# Patient Record
Sex: FEMALE | Race: WHITE | NOT HISPANIC OR LATINO | URBAN - METROPOLITAN AREA
[De-identification: names, ages, dates, MRNs, and addresses within clinical notes are randomized per-mention and may not be internally consistent; named-entity substitution may affect disease eponyms.]

---

## 2017-04-03 PROBLEM — Z34.90 PREGNANCY: Status: ACTIVE | Noted: 2017-04-03

## 2017-04-06 PROBLEM — Z34.90 PREGNANCY: Status: RESOLVED | Noted: 2017-04-03 | Resolved: 2017-04-06

## 2017-05-22 PROBLEM — Z12.31 ENCOUNTER FOR SCREENING MAMMOGRAM FOR HIGH-RISK PATIENT: Status: ACTIVE | Noted: 2017-05-22

## 2018-09-26 PROBLEM — O30.039 MONOCHORIONIC DIAMNIOTIC TWIN PREGNANCY, ANTEPARTUM: Status: ACTIVE | Noted: 2018-09-26

## 2018-10-24 PROBLEM — O35.EXX0 RENAL AGENESIS OF FETUS AFFECTING ANTEPARTUM CARE OF MOTHER: Status: ACTIVE | Noted: 2018-10-24

## 2018-10-24 PROBLEM — O35.EXX0 RENAL AGENESIS OF FETUS AFFECTING ANTEPARTUM CARE OF MOTHER: Status: RESOLVED | Noted: 2018-10-24 | Resolved: 2018-10-24

## 2018-10-24 PROBLEM — O35.EXX0 ULTRASOUND RECHECK OF FETAL PYELECTASIS, ANTEPARTUM: Status: ACTIVE | Noted: 2018-10-24

## 2018-12-24 PROBLEM — O47.03 PRETERM UTERINE CONTRACTIONS IN THIRD TRIMESTER, ANTEPARTUM: Status: ACTIVE | Noted: 2018-12-24

## 2018-12-30 PROBLEM — Z34.90 TERM PREGNANCY: Status: ACTIVE | Noted: 2018-12-30

## 2018-12-31 PROBLEM — Z34.90 TERM PREGNANCY: Status: RESOLVED | Noted: 2018-12-30 | Resolved: 2018-12-31

## 2018-12-31 PROBLEM — O47.03 PRETERM UTERINE CONTRACTIONS IN THIRD TRIMESTER, ANTEPARTUM: Status: RESOLVED | Noted: 2018-12-24 | Resolved: 2018-12-31

## 2018-12-31 PROBLEM — O30.019 MONOCHORIONIC AND MONOAMNIOTIC TWIN GESTATION: Status: ACTIVE | Noted: 2018-12-31

## 2018-12-31 PROBLEM — O35.EXX0 ULTRASOUND RECHECK OF FETAL PYELECTASIS, ANTEPARTUM: Status: RESOLVED | Noted: 2018-10-24 | Resolved: 2018-12-31

## 2019-01-02 PROBLEM — Z12.31 ENCOUNTER FOR SCREENING MAMMOGRAM FOR HIGH-RISK PATIENT: Status: RESOLVED | Noted: 2017-05-22 | Resolved: 2019-01-02

## 2019-01-02 PROBLEM — O30.039 MONOCHORIONIC DIAMNIOTIC TWIN PREGNANCY, ANTEPARTUM: Status: RESOLVED | Noted: 2018-09-26 | Resolved: 2019-01-02

## 2024-08-19 ENCOUNTER — TRANSCRIBE ORDERS (OUTPATIENT)
Dept: ADMINISTRATIVE | Facility: HOSPITAL | Age: 34
End: 2024-08-19

## 2024-08-19 DIAGNOSIS — Z12.31 ENCOUNTER FOR SCREENING MAMMOGRAM FOR MALIGNANT NEOPLASM OF BREAST: Primary | ICD-10-CM

## 2024-08-24 ENCOUNTER — TRANSCRIBE ORDERS (OUTPATIENT)
Dept: ADMINISTRATIVE | Facility: HOSPITAL | Age: 34
End: 2024-08-24
Payer: COMMERCIAL

## 2024-08-24 DIAGNOSIS — Z12.31 SCREENING MAMMOGRAM FOR HIGH-RISK PATIENT: Primary | ICD-10-CM

## 2024-08-29 LAB — NCCN CRITERIA FLAG: ABNORMAL

## 2024-12-08 ENCOUNTER — HOSPITAL ENCOUNTER (EMERGENCY)
Facility: HOSPITAL | Age: 34
Discharge: HOME | End: 2024-12-08
Attending: EMERGENCY MEDICINE | Admitting: EMERGENCY MEDICINE
Payer: COMMERCIAL

## 2024-12-08 ENCOUNTER — APPOINTMENT (OUTPATIENT)
Facility: HOSPITAL | Age: 34
End: 2024-12-08
Payer: COMMERCIAL

## 2024-12-08 VITALS
WEIGHT: 170 LBS | OXYGEN SATURATION: 100 % | HEART RATE: 104 BPM | SYSTOLIC BLOOD PRESSURE: 93 MMHG | DIASTOLIC BLOOD PRESSURE: 60 MMHG | BODY MASS INDEX: 30.12 KG/M2 | TEMPERATURE: 99.2 F | HEIGHT: 63 IN | RESPIRATION RATE: 18 BRPM

## 2024-12-08 DIAGNOSIS — J18.9 PNEUMONIA OF LEFT LOWER LOBE DUE TO INFECTIOUS ORGANISM: Primary | ICD-10-CM

## 2024-12-08 LAB
FLUAV RNA RESP QL NAA+PROBE: NOT DETECTED
FLUBV RNA RESP QL NAA+PROBE: NOT DETECTED
RSV RNA RESP QL NAA+PROBE: NOT DETECTED
SARS-COV-2 RNA RESP QL NAA+PROBE: NOT DETECTED

## 2024-12-08 PROCEDURE — 87637 SARSCOV2&INF A&B&RSV AMP PRB: CPT | Performed by: EMERGENCY MEDICINE

## 2024-12-08 PROCEDURE — 71045 X-RAY EXAM CHEST 1 VIEW: CPT

## 2024-12-08 PROCEDURE — 99283 EMERGENCY DEPT VISIT LOW MDM: CPT

## 2024-12-08 PROCEDURE — 94640 AIRWAY INHALATION TREATMENT: CPT

## 2024-12-08 RX ORDER — ALBUTEROL SULFATE 0.83 MG/ML
2.5 SOLUTION RESPIRATORY (INHALATION) ONCE
Status: COMPLETED | OUTPATIENT
Start: 2024-12-08 | End: 2024-12-08

## 2024-12-08 RX ORDER — DOXYCYCLINE 100 MG/1
100 CAPSULE ORAL 2 TIMES DAILY
Qty: 19 CAPSULE | Refills: 0 | Status: SHIPPED | OUTPATIENT
Start: 2024-12-08 | End: 2024-12-18

## 2024-12-08 RX ORDER — DOXYCYCLINE 100 MG/1
100 CAPSULE ORAL ONCE
Status: COMPLETED | OUTPATIENT
Start: 2024-12-08 | End: 2024-12-08

## 2024-12-08 RX ORDER — IBUPROFEN 200 MG
600 TABLET ORAL ONCE
Status: COMPLETED | OUTPATIENT
Start: 2024-12-08 | End: 2024-12-08

## 2024-12-08 RX ORDER — DOXYCYCLINE 100 MG/1
100 CAPSULE ORAL 2 TIMES DAILY
Qty: 19 CAPSULE | Refills: 0 | Status: SHIPPED | OUTPATIENT
Start: 2024-12-08 | End: 2024-12-08

## 2024-12-08 RX ADMIN — IBUPROFEN 600 MG: 200 TABLET, FILM COATED ORAL at 12:48

## 2024-12-08 RX ADMIN — ALBUTEROL SULFATE 2.5 MG: 2.5 SOLUTION RESPIRATORY (INHALATION) at 13:27

## 2024-12-08 RX ADMIN — DOXYCYCLINE 100 MG: 100 CAPSULE ORAL at 13:41

## 2024-12-08 NOTE — ED NOTES
Call received pt stating the pharmacy has not received her script. Call placed to [facility] [address]. Confirmed they have received script.

## 2024-12-09 NOTE — FSED PROVIDER NOTE
Subjective  History of Present Illness:    Presents with shortness of breath cough is been ongoing last couple days just cannot catch her breath does not have any history of asthma no fevers chills nausea vomiting abdominal pain no sick contacts      Nurses Notes reviewed and agree, including vitals, allergies, social history and prior medical history.     REVIEW OF SYSTEMS: All systems reviewed and not pertinent unless noted.  Review of Systems    Past Medical History:   Diagnosis Date    Breast cancer 2016    w radiation    Ductal carcinoma     Hx of radiation therapy 2016    Postoperative pain     mild       Allergies:    Patient has no known allergies.      Past Surgical History:   Procedure Laterality Date    BREAST BIOPSY  2016    sentinel node    BREAST LUMPECTOMY  2016         Social History     Socioeconomic History    Marital status:    Tobacco Use    Smoking status: Former     Current packs/day: 0.00     Average packs/day: 0.5 packs/day for 10.0 years (5.0 ttl pk-yrs)     Types: Cigarettes     Start date: 3/30/2008     Quit date: 3/30/2018     Years since quittin.7    Smokeless tobacco: Never   Substance and Sexual Activity    Alcohol use: No     Alcohol/week: 2.0 standard drinks of alcohol     Types: 1 Glasses of wine, 1 Standard drinks or equivalent per week     Comment: 2-3 beverages a month, when not pregnant    Drug use: No    Sexual activity: Yes     Partners: Male     Birth control/protection: I.U.D.         Family History   Problem Relation Age of Onset    Breast cancer Other 80    Stomach cancer Other     Lung cancer Other     Heart disease Maternal Grandmother     Other Maternal Grandmother         vascular disease    Heart disease Maternal Grandfather     Other Maternal Grandfather         vascular disease    Cancer Mother          this year from ovarian cancer    Ovarian cancer Neg Hx        Objective  Physical Exam:  BP 93/60   Pulse 104   Temp 99.2 °F (37.3  "°C) (Oral)   Resp 18   Ht 160 cm (63\")   Wt 77.1 kg (170 lb)   LMP 12/02/2024 (Approximate)   SpO2 100%   BMI 30.11 kg/m²      Physical Exam  Vitals and nursing note reviewed.   Constitutional:       General: She is not in acute distress.     Appearance: Normal appearance. She is not ill-appearing, toxic-appearing or diaphoretic.   HENT:      Head: Normocephalic and atraumatic.      Nose: Nose normal.      Mouth/Throat:      Mouth: Mucous membranes are moist.   Eyes:      Conjunctiva/sclera: Conjunctivae normal.      Pupils: Pupils are equal, round, and reactive to light.   Cardiovascular:      Rate and Rhythm: Normal rate and regular rhythm.      Pulses: Normal pulses.      Heart sounds: Normal heart sounds.   Pulmonary:      Effort: Pulmonary effort is normal.      Breath sounds: Rhonchi present. No wheezing.   Abdominal:      General: Abdomen is flat.      Tenderness: There is no abdominal tenderness.   Musculoskeletal:         General: Normal range of motion.      Cervical back: Normal range of motion.   Skin:     General: Skin is warm and dry.      Capillary Refill: Capillary refill takes less than 2 seconds.   Neurological:      General: No focal deficit present.      Mental Status: She is alert and oriented to person, place, and time.   Psychiatric:         Mood and Affect: Mood normal.         Behavior: Behavior normal.         Procedures    ED Course:         Lab Results (last 24 hours)       Procedure Component Value Units Date/Time    COVID PRE-OP / PRE-PROCEDURE SCREENING ORDER (NO ISOLATION) - Swab, Nasopharynx [[id number]]  (Normal) Collected: 12/08/24 1246    Specimen: Swab from Nasopharynx Updated: 12/08/24 1327    Narrative:      The following orders were created for panel order COVID PRE-OP / PRE-PROCEDURE SCREENING ORDER (NO ISOLATION) - Swab, Nasopharynx.  Procedure                               Abnormality         Status                     ---------                               " -----------         ------                     COVID-19, FLU A/B, RSV P...[[id number]]  Normal              Final result                 Please view results for these tests on the individual orders.    COVID-19, FLU A/B, RSV PCR 1 HR TAT - Swab, Nasopharynx [[id number]]  (Normal) Collected: 12/08/24 1246    Specimen: Swab from Nasopharynx Updated: 12/08/24 1327     COVID19 Not Detected     Influenza A PCR Not Detected     Influenza B PCR Not Detected     RSV, PCR Not Detected    Narrative:      Fact sheet for providers: https://www.fda.gov/media/680822/download    Fact sheet for patients: https://www.fda.gov/media/074498/download    Test performed by PCR.             XR Chest 1 View    Result Date: 12/8/2024  XR CHEST 1 VW Date of Exam: 12/8/2024 12:55 PM EST Indication: Cough flu like symptoms Comparison: None available. Findings: Lungs are adequately expanded. There is suspected mild retrocardiac left basilar opacity. No large pleural effusion is seen. Heart size appears within normal limits.     Impression: Impression: Suspected mild retrocardiac left basilar opacity, which may be due to atelectasis and/or pneumonia. Electronically Signed: Elizabeth Calvillo  12/8/2024 1:20 PM EST  Workstation ID: [id number]        MDM      Summary patient presenting with cough congestion shortness of breath concern for pneumonia.  CBC CMP unremarkable patient chest x-ray does show a left lower lobe opacity patient does have rhonchi on exam.  Patient was given DuoNeb.  Patient feeling much better upon reassessment patient given initial dose of doxycycline prescription sent to the pharmacy for further management.  Given return precautions discharged well-appearing      Data interpreted: Nursing notes reviewed, vital signs reviewed.  Labs independently interpreted by me (CBC, CMP, lipase, UA, troponin, ABG, lactic acid, procalcitonin).  Imaging independently interpreted by me (x-ray, CT scan).  EKG independently interpreted by me.  O2  saturation:    Counseling: Discussed the results above with the patient regarding need for admission or discharge.  Patient understands and agrees plan of care.      -----  ED Disposition       ED Disposition   Discharge    Condition   Stable    Comment   --             Final diagnoses:   Pneumonia of left lower lobe due to infectious organism      Your Follow-Up Providers       Provider, No Known.    [facility] [id number]                       Contact information for after-discharge care    Follow-up information has not been specified.                    Your medication list        START taking these medications        Instructions Last Dose Given Next Dose Due   doxycycline 100 MG capsule  Commonly known as: VIBRAMYCIN      Take 1 capsule by mouth 2 (Two) Times a Day for 10 days.              CONTINUE taking these medications        Instructions Last Dose Given Next Dose Due   ibuprofen 600 MG tablet  Commonly known as: ADVIL,MOTRIN      Take 1 tablet by mouth Every 6 (Six) Hours As Needed for Mild Pain .       Lidocaine Viscous HCl 2 % solution  Commonly known as: XYLOCAINE      Apply 5 ml topically to affected area every 3 hrs as needed.       valACYclovir 1000 MG tablet  Commonly known as: VALTREX      Take 1 tablet by mouth 2 (Two) Times a Day.                 Where to Get Your Medications        These medications were sent to [facility] #[id number] - [place], [place] - [address] - [phone]  - [phone] FX  [address], [facility] [address]      Phone: [phone]   doxycycline 100 MG capsule